# Patient Record
Sex: FEMALE | Race: ASIAN | NOT HISPANIC OR LATINO | Employment: STUDENT | URBAN - METROPOLITAN AREA
[De-identification: names, ages, dates, MRNs, and addresses within clinical notes are randomized per-mention and may not be internally consistent; named-entity substitution may affect disease eponyms.]

---
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
## 2021-05-31 VITALS — BODY MASS INDEX: 18.8 KG/M2 | HEIGHT: 66 IN | WEIGHT: 117 LBS
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
## 2021-11-10 ENCOUNTER — OFFICE VISIT (OUTPATIENT)
Dept: FAMILY MEDICINE | Facility: CLINIC | Age: 21
End: 2021-11-10
Payer: COMMERCIAL

## 2021-11-10 VITALS
SYSTOLIC BLOOD PRESSURE: 96 MMHG | WEIGHT: 145 LBS | DIASTOLIC BLOOD PRESSURE: 58 MMHG | BODY MASS INDEX: 23.3 KG/M2 | HEART RATE: 84 BPM | HEIGHT: 66 IN | TEMPERATURE: 98 F | RESPIRATION RATE: 14 BRPM

## 2021-11-10 DIAGNOSIS — Z00.00 ANNUAL PHYSICAL EXAM: Primary | ICD-10-CM

## 2021-11-10 PROCEDURE — 91300 PR COVID VAC PFIZER DIL RECON 30 MCG/0.3 ML IM: CPT | Performed by: PHYSICIAN ASSISTANT

## 2021-11-10 PROCEDURE — 99385 PREV VISIT NEW AGE 18-39: CPT | Mod: 25 | Performed by: PHYSICIAN ASSISTANT

## 2021-11-10 PROCEDURE — 0001A PR COVID VAC PFIZER DIL RECON 30 MCG/0.3 ML IM: CPT | Performed by: PHYSICIAN ASSISTANT

## 2021-11-10 ASSESSMENT — MIFFLIN-ST. JEOR: SCORE: 1435.47

## 2021-11-10 NOTE — PROGRESS NOTES
Answers for HPI/ROS submitted by the patient on 11/10/2021  Frequency of exercise:: 2-3 days/week  Getting at least 3 servings of Calcium per day:: Yes  Diet:: Regular (no restrictions)  Taking medications regularly:: Yes  Medication side effects:: None  Bi-annual eye exam:: Yes  Dental care twice a year:: Yes  Sleep apnea or symptoms of sleep apnea:: None  Additional concerns today:: No  Duration of exercise:: N/A    SUBJECTIVE    Esperanza Stein is a 20 year old female who presents for an annual exam.    Other concerns today:  No concerns.  Going to technical college.  Living with her family.  No children.  No significant past medical history.  Chart history will be updated as appropriate.  She is on her period today, wants to defer Pap for now.       Immunization History   Administered Date(s) Administered     COVID-19,PF,Pfizer (12+ Yrs) 11/10/2021     DTaP, Unspecified 02/11/2000, 04/14/2000, 07/14/2000, 01/19/2001, 11/18/2005     FLU 6-35 months 01/13/2014     Flu, Unspecified 10/27/2007, 11/05/2008     HPV Quadrivalent 05/30/2012, 08/17/2012, 04/02/2013     Hep B, Peds or Adolescent 1999, 1999, 02/11/2000, 12/04/2000     HepA-ped 2 Dose 08/17/2012, 04/02/2013     Hib, Unspecified 02/11/2000, 04/14/2000, 12/04/2000     Influenza Vaccine IM > 6 months Valent IIV4 (Alfuria,Fluzone) 12/07/2017     Influenza Vaccine, 6+MO IM (QUADRIVALENT W/PRESERVATIVES) 09/22/2009, 10/21/2010, 10/11/2011, 09/10/2012     MMR 12/04/2000, 03/18/2004, 11/18/2005     Meningococcal (Bexsero ) 01/31/2018, 03/28/2018     Meningococcal (Menactra ) 05/30/2012     Meningococcal (Menomune ) 11/23/2015     Poliovirus, inactivated (IPV) 02/11/2000, 04/14/2000, 07/14/2000, 11/18/2005     Tdap (Adacel,Boostrix) 05/30/2012     Varicella 12/04/2000, 12/21/2017     Varicella Pt Report Hx of Varicella/Chicken Pox 08/21/2015       Gynecologic History  Patient's last menstrual period was 11/09/2021.  Contraception: none  Last pap: never    OB  "History   No obstetric history on file.       No current outpatient medications on file.     No current facility-administered medications for this visit.       History reviewed. No pertinent past medical history.    History reviewed. No pertinent surgical history.     Family History   Problem Relation Age of Onset     No Known Problems Mother      No Known Problems Father         Review of Systems  Complete Review of Systems is discussed with patient and is negative except as noted in HPI.    OBJECTIVE    Vitals:    11/10/21 0827   BP: 96/58   BP Location: Left arm   Patient Position: Sitting   Cuff Size: Adult Regular   Pulse: 84   Resp: 14   Temp: 98  F (36.7  C)   TempSrc: Temporal   Weight: 65.8 kg (145 lb)   Height: 1.67 m (5' 5.75\")       Body mass index is 23.58 kg/m .    Physical Exam:  General: patient is alert and oriented x 3, in no apparent distress  HEENT, Thyroid, Lymphatic, Cardiac, Pulmonary, GI, Musculoskeletal, Skin, and Neuro exams were completed today and grossly normal  Breast exam: Deferred.  Genitourinary: Deferred      ASSESSMENT and PLAN    1. Annual physical exam  Health maintenance is discussed with patient as appropriate for age and risk factors.  Patient declined Pap smear today, will schedule a visit in the near future to get Pap smear and STD screening.  She declines offer of birth control.  Declined flu shot.    She did get her first Covid vaccine today.    This dictation uses voice recognition software, which may contain typographical errors.      "

## 2021-12-02 ENCOUNTER — NURSE TRIAGE (OUTPATIENT)
Dept: NURSING | Facility: CLINIC | Age: 21
End: 2021-12-02
Payer: COMMERCIAL

## 2021-12-02 NOTE — TELEPHONE ENCOUNTER
Patient is calling to get second covid vaccine Pfizer.      [facility] tried to put order in but it would could not as there is already a previous order in [facility].  Patient states that she will go to clinic/pharmacy.      Request for 2nd COVID-19 vaccination due to 1st dose being received in another state. Vaccination received in  [facility] -PROCEED     RN obtained the following information from: Patient      Has patient received Monoclonal Antibody Treatment in the previous 90 days?  NO - Okay to Proceed    The name of 1st dose vaccine product received as first dose: Pfizer-BioNTech    Date 1st dose vaccination was given: 11/10/2021    Lot #: [id number]    The 2nd dose of the mRNA COVID-19 vaccine product should be the same vaccine product as the 1st dose and be administered within appropriate timeframe.     Based on the information collected, the patient should receive the vaccine after 12/02/2021 date.           Before placing the order, Confirm patient is appropriate for the vaccine product they received:  o Moderna: 18 Years   o Pfizer-BioNTech: 5 Years      Patient reminded that CONSENT will be needed at the time of the vaccine.    Patient reminded to bring vaccine card or documentation to verify first dose.    Remind the patient not to get any other vaccinations between now and the appointment, unless instructed by their provider.      Copy blue text above regarding the 1st dose and paste into appropriate order:    MODERNA COVID-19 VACCINE 2ND DOSE APPT; OR    PFIZER COVID-19 VACCINE 2ND DOSE APPT    Update Expected Date in order to reflect date in copied text    Dx Code Z23 HIGH PRIORITY FOR 2019-nCoV vaccine

## 2021-12-02 NOTE — TELEPHONE ENCOUNTER
Esperanza had an appointment yesterday for covid vaccine and missed the appointment.  Today is calling to reschedule.    COVID 19 Nurse Triage Plan/Patient Instructions    Please be aware that novel coronavirus (COVID-19) may be circulating in the community. If you develop symptoms such as fever, cough, or SOB or if you have concerns about the presence of another infection including coronavirus (COVID-19), please contact your health care provider or visit [email or web address].     Disposition/Instructions    Home care recommended. Follow home care protocol based instructions.    Thank you for taking steps to prevent the spread of this virus.  o Limit your contact with others.  o Wear a simple mask to cover your cough.  o Wash your hands well and often.    Resources    [facility]: About COVID-19: [email or web address]    CDC: What to Do If You're Sick: www.cdc.gov/coronavirus/2019-ncov/about/steps-when-sick.html    CDC: Ending Home Isolation: www.cdc.gov/coronavirus/2019-ncov/hcp/disposition-in-home-patients.html     CDC: Caring for Someone: www.cdc.gov/coronavirus/2019-ncov/if-you-are-sick/care-for-someone.html     [facility]: Interim Guidance for Hospital Discharge to Home: www.health.[facility].[place]/diseases/coronavirus/hcp/hospdischarge.pdf    [facility] clinical trials (COVID-19 research studies): [email or web address]-clinical-trials     Below are the COVID-19 hotlines at the [facility] of Health ([facility]). Interpreters are available.   o For health questions: Call [phone] or [phone] (7 a.m. to 7 p.m.)  o For questions about schools and childcare: Call [phone] or [phone] (7 a.m. to 7 p.m.)

## 2021-12-30 ENCOUNTER — IMMUNIZATION (OUTPATIENT)
Dept: NURSING | Facility: CLINIC | Age: 21
End: 2021-12-30
Attending: PHYSICIAN ASSISTANT
Payer: COMMERCIAL

## 2021-12-30 PROCEDURE — 91300 PR COVID VAC PFIZER DIL RECON 30 MCG/0.3 ML IM: CPT

## 2021-12-30 PROCEDURE — 0002A PR COVID VAC PFIZER DIL RECON 30 MCG/0.3 ML IM: CPT

## 2022-03-22 ENCOUNTER — OFFICE VISIT (OUTPATIENT)
Dept: FAMILY MEDICINE | Facility: CLINIC | Age: 22
End: 2022-03-22
Payer: COMMERCIAL

## 2022-03-22 VITALS
TEMPERATURE: 97.7 F | HEART RATE: 57 BPM | DIASTOLIC BLOOD PRESSURE: 67 MMHG | HEIGHT: 66 IN | WEIGHT: 130.9 LBS | SYSTOLIC BLOOD PRESSURE: 101 MMHG | BODY MASS INDEX: 21.04 KG/M2

## 2022-03-22 DIAGNOSIS — Z00.00 ADULT GENERAL MEDICAL EXAM: Primary | ICD-10-CM

## 2022-03-22 DIAGNOSIS — Z11.3 SCREENING FOR STDS (SEXUALLY TRANSMITTED DISEASES): ICD-10-CM

## 2022-03-22 DIAGNOSIS — N89.8 VAGINAL DISCHARGE: ICD-10-CM

## 2022-03-22 DIAGNOSIS — Z12.4 CERVICAL CANCER SCREENING: ICD-10-CM

## 2022-03-22 DIAGNOSIS — N92.6 IRREGULAR MENSTRUAL BLEEDING: ICD-10-CM

## 2022-03-22 DIAGNOSIS — Z11.59 NEED FOR HEPATITIS C SCREENING TEST: ICD-10-CM

## 2022-03-22 DIAGNOSIS — Z11.4 SCREENING FOR HIV (HUMAN IMMUNODEFICIENCY VIRUS): ICD-10-CM

## 2022-03-22 LAB
CLUE CELLS: NORMAL
TRICHOMONAS, WET PREP: NORMAL
WBC'S/HIGH POWER FIELD, WET PREP: NORMAL
YEAST, WET PREP: NORMAL

## 2022-03-22 PROCEDURE — 87210 SMEAR WET MOUNT SALINE/INK: CPT | Performed by: FAMILY MEDICINE

## 2022-03-22 PROCEDURE — 87591 N.GONORRHOEAE DNA AMP PROB: CPT | Performed by: FAMILY MEDICINE

## 2022-03-22 PROCEDURE — G0123 SCREEN CERV/VAG THIN LAYER: HCPCS | Performed by: FAMILY MEDICINE

## 2022-03-22 PROCEDURE — 99395 PREV VISIT EST AGE 18-39: CPT | Performed by: FAMILY MEDICINE

## 2022-03-22 PROCEDURE — 87491 CHLMYD TRACH DNA AMP PROBE: CPT | Performed by: FAMILY MEDICINE

## 2022-03-22 NOTE — PROGRESS NOTES
SUBJECTIVE:   CC: Esperanza Stein is an 21 year old woman who presents for preventive health visit.       Patient has been advised of split billing requirements and indicates understanding: Yes  Here for physical  Has been bleeding for a while  Took Plan B on 3/2 - bleeding   2 days stopped - started bleeding again    Period had started 2/21 - stopped 2/27 -   Started bleeding on 3/6 - stopped for a couple days -     No other birth control  No other meds  Has used Plan B in the past as well -     Vaginal discharge - smelly -       Healthy Habits:     Getting at least 3 servings of Calcium per day:  NO    Bi-annual eye exam:  Yes    Dental care twice a year:  Yes    Sleep apnea or symptoms of sleep apnea:  None    Diet:  Other    Frequency of exercise:  4-5 days/week    Duration of exercise:  Greater than 60 minutes    Taking medications regularly:  Not Applicable    Medication side effects:  Not applicable    PHQ-2 Total Score: 0    Additional concerns today:  No      Today's PHQ-2 Score:   PHQ-2 ( 1999 Pfizer) 3/22/2022   Q1: Little interest or pleasure in doing things 0   Q2: Feeling down, depressed or hopeless 0   PHQ-2 Score 0   Q1: Little interest or pleasure in doing things Not at all   Q2: Feeling down, depressed or hopeless Not at all   PHQ-2 Score 0       Abuse: Current or Past (Physical, Sexual or Emotional) - No  Do you feel safe in your environment? YES    Have you ever done Advance Care Planning? (For example, a Health Directive, POLST, or a discussion with a medical provider or your loved ones about your wishes): discussed    Social History     Tobacco Use     Smoking status: Never Smoker     Smokeless tobacco: Never Used     Tobacco comment: no 2nd hand smoke   Substance Use Topics     Alcohol use: No         Alcohol Use 3/22/2022   Prescreen: >3 drinks/day or >7 drinks/week? No       Reviewed orders with patient.  Reviewed health maintenance and updated orders accordingly - Yes  BP Readings from Last 3  "Encounters:   03/22/22 101/67   11/10/21 96/58    Wt Readings from Last 3 Encounters:   03/22/22 59.4 kg (130 lb 14.4 oz)   11/10/21 65.8 kg (145 lb)   12/07/17 53.1 kg (117 lb) (35 %, Z= -0.38)*     * Growth percentiles are based on [facility] (Girls, 2-20 Years) data.                  There is no problem list on file for this patient.    No past surgical history on file.    Social History     Tobacco Use     Smoking status: Never Smoker     Smokeless tobacco: Never Used     Tobacco comment: no 2nd hand smoke   Substance Use Topics     Alcohol use: No     Family History   Problem Relation Age of Onset     No Known Problems Mother      No Known Problems Father          No current outpatient medications on file.     No Known Allergies    Breast Cancer Screening:        History of abnormal Pap smear: NO - age 21-29 PAP every 3 years recommended  PAP / HPV Latest Ref Rng & Units 3/22/2022   PAP   Negative for Intraepithelial Lesion or Malignancy (NILM)     Reviewed and updated as needed this visit by clinical staff   Tobacco  Allergies  Meds              Reviewed and updated as needed this visit by Provider                 No past medical history on file.   No past surgical history on file.  OB History   No obstetric history on file.       Review of Systems   Genitourinary: Positive for menstrual problem (irregular bleeding began after taking Plan B).   All other systems reviewed and are negative.         OBJECTIVE:   /67   Pulse 57   Temp 97.7  F (36.5  C)   Ht 1.676 m (5' 6\")   Wt 59.4 kg (130 lb 14.4 oz)   LMP 03/21/2022   BMI 21.13 kg/m    Physical Exam  Constitutional:       General: She is not in acute distress.     Appearance: She is well-developed.   HENT:      Right Ear: Tympanic membrane and external ear normal.      Left Ear: Tympanic membrane and external ear normal.      Nose: Nose normal.      Mouth/Throat:      Pharynx: No oropharyngeal exudate.   Eyes:      General:         Right eye: No " discharge.         Left eye: No discharge.      Conjunctiva/sclera: Conjunctivae normal.      Pupils: Pupils are equal, round, and reactive to light.   Neck:      Thyroid: No thyromegaly.      Trachea: No tracheal deviation.   Cardiovascular:      Rate and Rhythm: Normal rate and regular rhythm.      Pulses: Normal pulses.      Heart sounds: Normal heart sounds, S1 normal and S2 normal. No murmur heard.    No friction rub. No S3 or S4 sounds.   Pulmonary:      Effort: Pulmonary effort is normal. No respiratory distress.      Breath sounds: Normal breath sounds. No wheezing or rales.   Chest:   Breasts:      Right: No mass, nipple discharge or tenderness.      Left: No mass, nipple discharge or tenderness.       Abdominal:      General: Bowel sounds are normal.      Palpations: Abdomen is soft. There is no mass.      Tenderness: There is no abdominal tenderness.   Musculoskeletal:         General: Normal range of motion.      Cervical back: Neck supple.   Lymphadenopathy:      Cervical: No cervical adenopathy.   Skin:     General: Skin is warm and dry.      Findings: No rash.   Neurological:      Mental Status: She is alert and oriented to person, place, and time.      Motor: No abnormal muscle tone.      Deep Tendon Reflexes: Reflexes are normal and symmetric.   Psychiatric:         Thought Content: Thought content normal.         Judgment: Judgment normal.           Diagnostic Test Results:  Labs reviewed in Epic  Results for orders placed or performed in visit on 03/22/22   Pap Screen only - recommended age 21 - 24 years     Status: None   Result Value Ref Range    Interpretation        Negative for Intraepithelial Lesion or Malignancy (NILM)    Other Findings  Endometrial cells in a woman >=45 years of age; endometrial cells correlate with menstrual history provided, Trichomonas vaginalis, Fungal organisms morphologically consistent with Candida spp, Shift in vaginal sofia suggestive of bacterial vaginosis,...      Fungal organisms morphologically consistent with Candida spp    Comment         Papanicolaou Test Limitations:  Cervical cytology is a screening test with limited sensitivity, and regular screening is critical for cancer prevention.  Pap tests are primarily effective for the diagnosis/prevention of squamous cell carcinoma, not adenocarcinoma or other cancers.        Specimen Adequacy       Satisfactory for evaluation, endocervical/transformation zone component present    Clinical Information       none      Reflex Testing No     Previous Abnormal?       No[1st pap smear      Performing Labs       The technical component of this testing was completed at [facility]     Wet prep - Clinic Collect     Status: Normal    Specimen: Vagina; Swab   Result Value Ref Range    Trichomonas Absent Absent    Yeast Absent Absent    Clue Cells Absent Absent    WBCs/high power field None None   NEISSERIA GONORRHOEA PCR     Status: Normal    Specimen: Cervix; Swab   Result Value Ref Range    Neisseria gonorrhoeae Negative Negative   CHLAMYDIA TRACHOMATIS PCR     Status: Normal    Specimen: Cervix; Swab   Result Value Ref Range    Chlamydia trachomatis Negative Negative       ASSESSMENT/PLAN:   1. Adult general medical exam  20 yo female here for physical exam - reviewed      2. Irregular menstrual bleeding  Began after using Plan B - will do pregnancy test to rule out pregnancy, but I'm suspicious the bleeding is related to the contraceptive use  - HCG qualitative urine POCT, Enter/Edit Result    3. Vaginal discharge  At risk for infection - will check wet prep  - Wet prep - Clinic Collect    4. Screening for STDs (sexually transmitted diseases)  As above - at risk for STI - check swabs  - NEISSERIA GONORRHOEA PCR; Future  - CHLAMYDIA TRACHOMATIS PCR; Future  - Wet prep - Clinic Collect  - NEISSERIA GONORRHOEA PCR  - CHLAMYDIA TRACHOMATIS PCR    5. Screening for HIV (human immunodeficiency  "virus)  Discussed - no blood work done today    6. Need for hepatitis C screening test  Discussed - no blood work done today     7. Cervical cancer screening  Hasn't had a pap smear yet - will do this today  - Pap Screen only - recommended age 21 - 24 years; Future  - Pap Screen only - recommended age 21 - 24 years      Patient has been advised of split billing requirements and indicates understanding: Yes    COUNSELING:  Reviewed preventive health counseling, as reflected in patient instructions       Regular exercise       Healthy diet/nutrition       Contraception       Safe sex practices/STD prevention    Estimated body mass index is 21.13 kg/m  as calculated from the following:    Height as of this encounter: 1.676 m (5' 6\").    Weight as of this encounter: 59.4 kg (130 lb 14.4 oz).        She reports that she has never smoked. She has never used smokeless tobacco.      Counseling Resources:  ATP IV Guidelines  Pooled Cohorts Equation Calculator  Breast Cancer Risk Calculator  BRCA-Related Cancer Risk Assessment: FHS-7 Tool  FRAX Risk Assessment  ICSI Preventive Guidelines  Dietary Guidelines for Americans, 2010  USDA's MyPlate  ASA Prophylaxis  Lung CA Screening    BISI HUTSON MD  [facility]  "

## 2022-03-22 NOTE — LETTER
March 24, 2022      Esperanza M Emil  [address] [place] [address]        Dear ,    We are writing to inform you of your test results.    There is no sign of infection.      Resulted Orders   Wet prep - Clinic Collect   Result Value Ref Range    Trichomonas Absent Absent    Yeast Absent Absent    Clue Cells Absent Absent    WBCs/high power field None None   NEISSERIA GONORRHOEA PCR   Result Value Ref Range    Neisseria gonorrhoeae Negative Negative      Comment:      Negative for N. gonorrhoeae rRNA by transcription mediated amplification. A negative result by transcription mediated amplification does not preclude the presence of C. trachomatis infection because results are dependent on proper and adequate collection, absence of inhibitors and sufficient rRNA to be detected.   CHLAMYDIA TRACHOMATIS PCR   Result Value Ref Range    Chlamydia trachomatis Negative Negative      Comment:      A negative result by transcription mediated amplification does not preclude the presence of C. trachomatis infection because results are dependent on proper and adequate collection, absence of inhibitors and sufficient rRNA to be detected.       If you have any questions or concerns, please call the clinic at the number listed above.       Sincerely,      Tabitha Palm MD

## 2022-03-23 LAB
C TRACH DNA SPEC QL NAA+PROBE: NEGATIVE
N GONORRHOEA DNA SPEC QL NAA+PROBE: NEGATIVE

## 2022-03-25 LAB
BKR LAB AP GYN ADEQUACY: NORMAL
BKR LAB AP GYN INTERPRETATION: NORMAL
BKR LAB AP GYN OTHER FINDINGS: NORMAL
BKR LAB AP HPV REFLEX: NO
BKR LAB AP PREVIOUS ABNORMAL: NORMAL
PATH REPORT.COMMENTS IMP SPEC: NORMAL
PATH REPORT.COMMENTS IMP SPEC: NORMAL
PATH REPORT.RELEVANT HX SPEC: NORMAL

## 2022-04-01 DIAGNOSIS — B37.31 YEAST VAGINITIS: Primary | ICD-10-CM

## 2022-04-01 RX ORDER — FLUCONAZOLE 150 MG/1
150 TABLET ORAL ONCE
Qty: 1 TABLET | Refills: 0 | Status: SHIPPED | OUTPATIENT
Start: 2022-04-01 | End: 2022-04-01

## 2022-11-25 ENCOUNTER — E-VISIT (OUTPATIENT)
Dept: URGENT CARE | Facility: URGENT CARE | Age: 22
End: 2022-11-25
Payer: COMMERCIAL

## 2022-11-25 DIAGNOSIS — N39.0 ACUTE UTI (URINARY TRACT INFECTION): Primary | ICD-10-CM

## 2022-11-25 PROCEDURE — 99421 OL DIG E/M SVC 5-10 MIN: CPT | Performed by: NURSE PRACTITIONER

## 2022-11-25 RX ORDER — NITROFURANTOIN 25; 75 MG/1; MG/1
100 CAPSULE ORAL 2 TIMES DAILY
Qty: 10 CAPSULE | Refills: 0 | Status: SHIPPED | OUTPATIENT
Start: 2022-11-25 | End: 2022-11-30

## 2022-11-25 NOTE — PATIENT INSTRUCTIONS
Dear Esperanza Stein    After reviewing your responses, I've been able to diagnose you with a urinary tract infection, which is a common infection of the bladder with bacteria.  This is not a sexually transmitted infection, though urinating immediately after intercourse can help prevent infections.  Drinking lots of fluids is also helpful to clear your current infection and prevent the next one.      I have sent a prescription for antibiotics to your pharmacy to treat this infection.    It is important that you take all of your prescribed medication even if your symptoms are improving after a few doses.  Taking all of your medicine helps prevent the symptoms from returning.     If your symptoms worsen, you develop pain in your back or stomach, develop fevers, or are not improving in 5 days, please contact your primary care provider for an appointment or visit any of our convenient Walk-in or Urgent Care Centers to be seen, which can be found on our website here.    Thanks again for choosing us as your health care partner,    EMILY Castro CNP    Urinary Tract Infections in Women  Urinary tract infections (UTIs) are most often caused by bacteria. These bacteria enter the urinary tract. The bacteria may come from inside the body. Or they may travel from the skin outside the rectum or vagina into the urethra. Female anatomy makes it easy for bacteria from the bowel to enter a woman s urinary tract, which is the most common source of UTI. This means women develop UTIs more often than men. Pain in or around the urinary tract is a common UTI symptom. But the only way to know for sure if you have a UTI for the healthcare provider to test your urine. The two tests that may be done are the urinalysis and urine culture.     Types of UTIs    Cystitis. A bladder infection (cystitis) is the most common UTI in women. You may have urgent or frequent need to pee. You may also have pain, burning when you pee, and bloody  urine.    Urethritis. This is an inflamed urethra, which is the tube that carries urine from the bladder to outside the body. You may have lower stomach or back pain. You may also have urgent or frequent need to pee.    Pyelonephritis. This is a kidney infection. If not treated, it can be serious and damage your kidneys. In severe cases, you may need to stay in the hospital. You may have a fever and lower back pain.    Medicines to treat a UTI  Most UTIs are treated with antibiotics. These kill the bacteria. The length of time you need to take them depends on the type of infection. It may be as short as 3 days. If you have repeated UTIs, you may need a low-dose antibiotic for several months. Take antibiotics exactly as directed. Don t stop taking them until all of the medicine is gone. If you stop taking the antibiotic too soon, the infection may not go away. You may also develop a resistance to the antibiotic. This can make it much harder to treat.   Lifestyle changes to treat and prevent UTIs   The lifestyle changes below will help get rid of your UTI. They may also help prevent future UTIs.     Drink plenty of fluids. This includes water, juice, or other caffeine-free drinks. Fluids help flush bacteria out of your body.    Empty your bladder. Always empty your bladder when you feel the urge to pee. And always pee before going to sleep. Urine that stays in your bladder can lead to infection. Try to pee before and after sex as well.    Practice good personal hygiene. Wipe yourself from front to back after using the toilet. This helps keep bacteria from getting into the urethra.    Use condoms during sex. These help prevent UTIs caused by sexually transmitted bacteria. Also don't use spermicides during sex. These can increase the risk for UTIs. Choose other forms of birth control instead. For women who tend to get UTIs after sex, a low-dose of a preventive antibiotic may be used. Be sure to discuss this option with  your healthcare provider.    Follow up with your healthcare provider as directed. He or she may test to make sure the infection has cleared. If needed, more treatment may be started.  Vinnie last reviewed this educational content on 7/1/2019 2000-2021 [employer]. All rights reserved. This information is not intended as a substitute for professional medical care. Always follow your healthcare professional's instructions.

## 2023-02-11 ENCOUNTER — HEALTH MAINTENANCE LETTER (OUTPATIENT)
Age: 23
End: 2023-02-11

## 2023-04-20 ENCOUNTER — PATIENT OUTREACH (OUTPATIENT)
Dept: CARE COORDINATION | Facility: CLINIC | Age: 23
End: 2023-04-20
Payer: COMMERCIAL

## 2023-05-20 ENCOUNTER — HEALTH MAINTENANCE LETTER (OUTPATIENT)
Age: 23
End: 2023-05-20

## 2024-07-27 ENCOUNTER — HEALTH MAINTENANCE LETTER (OUTPATIENT)
Age: 24
End: 2024-07-27

## 2025-08-25 ENCOUNTER — TELEPHONE (OUTPATIENT)
Dept: FAMILY MEDICINE | Facility: CLINIC | Age: 25
End: 2025-08-25
Payer: MEDICAID

## 2025-08-27 ENCOUNTER — OFFICE VISIT (OUTPATIENT)
Dept: FAMILY MEDICINE | Facility: CLINIC | Age: 25
End: 2025-08-27
Payer: MEDICAID

## 2025-08-27 SDOH — HEALTH STABILITY: PHYSICAL HEALTH: ON AVERAGE, HOW MANY DAYS PER WEEK DO YOU ENGAGE IN MODERATE TO STRENUOUS EXERCISE (LIKE A BRISK WALK)?: 3 DAYS

## 2025-08-31 ENCOUNTER — HEALTH MAINTENANCE LETTER (OUTPATIENT)
Age: 25
End: 2025-08-31

## 2025-09-01 ASSESSMENT — ENCOUNTER SYMPTOMS
HEMATURIA: 0
VOMITING: 0
EYE PAIN: 0
CHILLS: 0
PALPITATIONS: 0
SORE THROAT: 0
COUGH: 0
FEVER: 0
SHORTNESS OF BREATH: 0
SEIZURES: 0
DYSURIA: 0
COLOR CHANGE: 0
BACK PAIN: 0
ABDOMINAL PAIN: 0
ARTHRALGIAS: 0

## 2025-09-04 ENCOUNTER — PATIENT OUTREACH (OUTPATIENT)
Dept: FAMILY MEDICINE | Facility: CLINIC | Age: 25
End: 2025-09-04
Payer: MEDICAID

## 2025-09-04 PROBLEM — R87.610 ASCUS WITH POSITIVE HIGH RISK HPV CERVICAL: Status: ACTIVE | Noted: 2025-09-04

## 2025-09-04 PROBLEM — R87.810 ASCUS WITH POSITIVE HIGH RISK HPV CERVICAL: Status: ACTIVE | Noted: 2025-09-04
